# Patient Record
Sex: MALE | Race: WHITE | URBAN - METROPOLITAN AREA
[De-identification: names, ages, dates, MRNs, and addresses within clinical notes are randomized per-mention and may not be internally consistent; named-entity substitution may affect disease eponyms.]

---

## 2017-09-20 ENCOUNTER — OFFICE VISIT (OUTPATIENT)
Dept: ORTHOPEDIC SURGERY | Age: 37
End: 2017-09-20

## 2017-09-20 VITALS
BODY MASS INDEX: 29 KG/M2 | HEIGHT: 75 IN | DIASTOLIC BLOOD PRESSURE: 98 MMHG | RESPIRATION RATE: 16 BRPM | HEART RATE: 81 BPM | SYSTOLIC BLOOD PRESSURE: 134 MMHG | WEIGHT: 233.2 LBS

## 2017-09-20 DIAGNOSIS — M54.50 LOW BACK PAIN WITHOUT SCIATICA, UNSPECIFIED BACK PAIN LATERALITY, UNSPECIFIED CHRONICITY: Primary | ICD-10-CM

## 2017-09-20 DIAGNOSIS — M51.36 OTHER INTERVERTEBRAL DISC DEGENERATION, LUMBAR REGION: ICD-10-CM

## 2017-09-20 DIAGNOSIS — M47.816 SPONDYLOSIS OF LUMBAR REGION WITHOUT MYELOPATHY OR RADICULOPATHY: ICD-10-CM

## 2017-09-20 DIAGNOSIS — M54.16 LUMBAR NEURITIS: ICD-10-CM

## 2017-09-20 RX ORDER — METHYLPREDNISOLONE 4 MG/1
TABLET ORAL
Qty: 1 DOSE PACK | Refills: 0 | Status: SHIPPED | OUTPATIENT
Start: 2017-09-20 | End: 2017-10-30 | Stop reason: ALTCHOICE

## 2017-09-20 RX ORDER — ACETAMINOPHEN 500 MG
TABLET ORAL
COMMUNITY

## 2017-09-20 RX ORDER — TRAMADOL HYDROCHLORIDE 50 MG/1
50 TABLET ORAL 2 TIMES DAILY
Qty: 40 TAB | Refills: 0 | Status: SHIPPED | OUTPATIENT
Start: 2017-09-20 | End: 2017-10-20 | Stop reason: SDUPTHER

## 2017-09-20 RX ORDER — CYCLOBENZAPRINE HCL 5 MG
TABLET ORAL
COMMUNITY
Start: 2017-09-15 | End: 2017-10-30 | Stop reason: ALTCHOICE

## 2017-09-20 RX ORDER — DICLOFENAC SODIUM 75 MG/1
75 TABLET, DELAYED RELEASE ORAL 2 TIMES DAILY WITH MEALS
Qty: 30 TAB | Refills: 0 | Status: SHIPPED | OUTPATIENT
Start: 2017-09-20 | End: 2017-10-20 | Stop reason: SDUPTHER

## 2017-09-20 RX ORDER — DICLOFENAC SODIUM 75 MG/1
TABLET, DELAYED RELEASE ORAL
COMMUNITY
Start: 2017-09-12 | End: 2017-10-30 | Stop reason: SDUPTHER

## 2017-09-20 NOTE — MR AVS SNAPSHOT
Visit Information Date & Time Provider Department Dept. Phone Encounter #  
 9/20/2017 10:00 AM Ellen Story MD [facility] [place] [phone] [id number] Follow-up Instructions Return in about 4 weeks (around 10/18/2017). Upcoming Health Maintenance Date Due DTaP/Tdap/Td series (1 - Tdap) 4/8/2001 INFLUENZA AGE 9 TO ADULT 8/1/2017 Allergies as of 9/20/2017  Review Complete On: 9/20/2017 By: Ellen Story MD  
 No Known Allergies Current Immunizations  Never Reviewed No immunizations on file. Not reviewed this visit You Were Diagnosed With   
  
 Codes Comments Low back pain without sciatica, unspecified back pain laterality, unspecified chronicity    -  Primary ICD-10-CM: M54.5 ICD-9-CM: 724.2 Spondylosis of lumbar region without myelopathy or radiculopathy     ICD-10-CM: M47.816 ICD-9-CM: 721.3 Other intervertebral disc degeneration, lumbar region     ICD-10-CM: M51.36 
ICD-9-CM: 722.52 Lumbar neuritis     ICD-10-CM: M54.16 
ICD-9-CM: 724.4 Vitals BP Pulse Resp Height(growth percentile) Weight(growth percentile) BMI  
 (!) 134/98 81 16 6' 3\" (1.905 m) 233 lb 3.2 oz (105.8 kg) 29.15 kg/m2 Smoking Status Never Smoker Vitals History BMI and BSA Data Body Mass Index Body Surface Area  
 29.15 kg/m 2 2.37 m 2 Preferred Pharmacy Pharmacy Name Phone  
 [address] [place] [address] [phone] Your Updated Medication List  
  
   
This list is accurate as of: 9/20/17 11:18 AM.  Always use your most recent med list.  
  
  
  
  
 cyclobenzaprine 5 mg tablet Commonly known as:  FLEXERIL  
  
 * diclofenac EC 75 mg EC tablet Commonly known as:  VOLTAREN  
  
 * diclofenac EC 75 mg EC tablet Commonly known as:  VOLTAREN Take 1 Tab by mouth two (2) times daily (with meals). methylPREDNISolone 4 mg tablet Commonly known as:  Colan Necessary Per dose pack instructions TYLENOL EXTRA STRENGTH 500 mg tablet Generic drug:  acetaminophen Take  by mouth every six (6) hours as needed for Pain. * Notice: This list has 2 medication(s) that are the same as other medications prescribed for you. Read the directions carefully, and ask your doctor or other care provider to review them with you. Prescriptions Sent to Pharmacy Refills  
 methylPREDNISolone (MEDROL DOSEPACK) 4 mg tablet 0 Sig: Per dose pack instructions Class: Normal  
 Pharmacy: [address] [place] [address] Ph #: [phone]  
 diclofenac EC (VOLTAREN) 75 mg EC tablet 0 Sig: Take 1 Tab by mouth two (2) times daily (with meals). Class: Normal  
 Pharmacy: [address] [place] [address] Ph #: [phone] Route: Oral  
  
We Performed the Following AMB POC XRAY, SPINE, LUMBOSACRAL; 2 O [81079 CPT(R)] REFERRAL TO PHYSICAL THERAPY [DGP09 Custom] Comments:  
 DX:LBP to LLE 
HEP 
Jorge Luis Leisure 2-3 visits/ 2-3 weeks Follow-up Instructions Return in about 4 weeks (around 10/18/2017). Referral Information Referral ID Referred By Referred To  
  
 [id number] GREG ORSA III Not Available Visits Status Start Date End Date 1 New Request 9/20/17 9/20/18 If your referral has a status of pending review or denied, additional information will be sent to support the outcome of this decision. Introducing [facility]! [facility] introduces [employer] patient portal. Now you can access parts of your medical record, email your doctor's office, and request medication refills online. 1. In your internet browser, go to [email or web address] 2. Click on the First Time User? Click Here link in the Sign In box. You will see the New Member Sign Up page. 3. Enter your [employer] Access Code exactly as it appears below. You will not need to use this code after youve completed the sign-up process. If you do not sign up before the expiration date, you must request a new code. · [employer] Access Code: [email or web address] Expires: 12/19/2017  9:03 AM 
 
4. Enter the last four digits of your Social Security Number (xxxx) and Date of Birth (mm/dd/yyyy) as indicated and click Submit. You will be taken to the next sign-up page. 5. Create a [employer] ID. This will be your [employer] login ID and cannot be changed, so think of one that is secure and easy to remember. 6. Create a [employer] password. You can change your password at any time. 7. Enter your Password Reset Question and Answer. This can be used at a later time if you forget your password. 8. Enter your e-mail address. You will receive e-mail notification when new information is available in [address] 9. Click Sign Up. You can now view and download portions of your medical record. 10. Click the Download Summary menu link to download a portable copy of your medical information. If you have questions, please visit the Frequently Asked Questions section of the [employer] website. Remember, [employer] is NOT to be used for urgent needs. For medical emergencies, dial 911. Now available from your iPhone and Android! Please provide this summary of care documentation to your next provider. Your primary care clinician is listed as Ethel Robbins. If you have any questions after today's visit, please call [phone].

## 2017-09-20 NOTE — PROGRESS NOTES
[facility]  [address], [address]  Phone: [phone]  Fax: [phone]        INITIAL CONSULTATION      HISTORY OF PRESENT ILLNESS:  Alvino Burgos is a 40 y.o. male whom is referred from [facility] secondary to low back and left hip pain. He additionally has left ankle pain with  paraesthesias of the left foot, involving all digits. Symptoms present x 3 months. Pt previously had a similar episode of low back pain nine years ago which resolved with Ibuprofen. He also recalls having low back pain while riding in a car in 2013 (duration unclear). No recent injury/trauma. He rates pain 6-8/10. Pt reports limitations with standing tolerance. Pt denies h/o lumbar spinal surgery or blocks. He has not attended physical therapy/chiropractor. Note from [facility] dated 9/12/17 indicating patient was seen with c/o left hip and ankle pain with low back pain x 2-3 months. At that time, he was started on Diclofenac without benefit. He has taken Flexeril and Tylenol without relief. Pt denies h/o DM. Pt denies change in bowel or bladder habits. Pt denies fever, weight loss, or skin changes.  reviewed. History reviewed. No pertinent past medical history. History reviewed. No pertinent surgical history. Social History   Substance Use Topics    Smoking status: Never Smoker    Smokeless tobacco: Never Used    Alcohol use No     Work status: Not available. Marital status: The patient is . No Known Allergies       Family History   Problem Relation Age of Onset    Thyroid Disease Mother     Diabetes Brother          REVIEW OF SYSTEMS  Constitutional symptoms: Negative  Eyes: Negative  Ears, Nose, Throat, and Mouth: Negative  Cardiovascular: Negative  Respiratory: Negative  Genitourinary: Negative  Integumentary (Skin and/or breast): Negative  Musculoskeletal: Positive for low back, left hip and left ankle/foot pain.   Extremities: Negative for edema. Endocrine/Rheumatologic: Negative  Hematologic/Lymphatic: Negative  Allergic/Immunologic: Negative  Psychiatric: Negative       PHYSICAL EXAMINATION    Visit Vitals    BP (!) 134/98    Pulse 81    Resp 16    Ht 6' 3\" (1.905 m)    Wt 233 lb 3.2 oz (105.8 kg)    BMI 29.15 kg/m2       CONSTITUTIONAL: NAD, A&O x 3  HEART: Regular rate and rhythm  ABDOMEN: Positive bowel sounds, soft, nontender, and nondistended  LUNGS: Clear to auscultation bilaterally. SKIN: No rashes noted. RANGE OF MOTION: The patient has full passive range of motion in all four extremities. SENSATION: Sensation is intact to light touch throughout. MOTOR:   Straight Leg Raise: Positive, left; reversed straight leg raise, right. Randolph: Negative, bilateral  Deep tendon reflexes are 1+ at the brachioradialis, biceps, and triceps. Deep tendon reflexes are 2+ at the knees and right ankle, and 0 at the left ankles. Shoulder AB/Flex Elbow Flex Wrist Ext Elbow Ext Wrist Flex Hand Intrin Tone   Right +4/5 +4/5 +4/5 +4/5 +4/5 +4/5 +4/5   Left +4/5 +4/5 +4/5 +4/5 +4/5 +4/5 +4/5              Hip Flex Knee Ext Knee Flex Ankle DF GTE Ankle PF Tone   Right +4/5 +4/5 +4/5 +4/5 +4/5 +4/5 +4/5   Left +4/5 +4/5 +4/5 +4/5 +4/5 +4/5 +4/5     RADIOGRAPHS  Preliminary reading of lumbar plain films:  Listing to the right. Nonspecific straightening of the normal lumbar lordosis. Mild disc space narrowing at L2-3, L5-S1. No acute pathology identified. These are being sent out for official reading by Dr. Heriberto Wilson. ASSESSMENT   Diagnoses and all orders for this visit:    1.  Low back pain without sciatica, unspecified back pain laterality, unspecified chronicity  -     [51912] L/S 2-3 views  -     REFERRAL TO PHYSICAL THERAPY    2. Spondylosis of lumbar region without myelopathy or radiculopathy  -     REFERRAL TO PHYSICAL THERAPY    3. Other intervertebral disc degeneration, lumbar region  -     REFERRAL TO PHYSICAL THERAPY    4. Lumbar neuritis  -     REFERRAL TO PHYSICAL THERAPY    Other orders  -     methylPREDNISolone (MEDROL DOSEPACK) 4 mg tablet; Per dose pack instructions  -     diclofenac EC (VOLTAREN) 75 mg EC tablet; Take 1 Tab by mouth two (2) times daily (with meals). -     traMADol (ULTRAM) 50 mg tablet; Take 1 Tab by mouth two (2) times a day. Max Daily Amount: 100 mg. IMPRESSIONS/RECOMMENDATIONS:  The patient presents for low back, left hip and left ankle pain along with left foot paraesthesias x 3 months. He is neurologically intact. We discussed multiple treatment options. I will start him on Medrol Dosepak. I gave him a prescription for Diclofenac following completion of the Medrol Dosepak. I will give him a Rx for Tramadol prn. I will refer him to physical therapy with an emphasis on HEP. I will see the patient back in 1 month's time or earlier if needed. Written by Anjana Burgos, as dictated by Angie Kam MD  I examined the patient, reviewed and agree with the note.

## 2017-10-12 ENCOUNTER — TELEPHONE (OUTPATIENT)
Dept: ORTHOPEDIC SURGERY | Age: 37
End: 2017-10-12

## 2017-10-12 NOTE — TELEPHONE ENCOUNTER
Patient called in requesting to speak with Dr. Almyra Castleman. Patient states that it is in regards to the medication he has taken prior.  Please contact patient  At [phone] before 3pm and [phone] after 3pm

## 2017-10-20 ENCOUNTER — OFFICE VISIT (OUTPATIENT)
Dept: ORTHOPEDIC SURGERY | Age: 37
End: 2017-10-20

## 2017-10-20 VITALS
DIASTOLIC BLOOD PRESSURE: 85 MMHG | WEIGHT: 230 LBS | SYSTOLIC BLOOD PRESSURE: 127 MMHG | BODY MASS INDEX: 28.75 KG/M2 | HEART RATE: 74 BPM | RESPIRATION RATE: 18 BRPM

## 2017-10-20 DIAGNOSIS — M51.36 OTHER INTERVERTEBRAL DISC DEGENERATION, LUMBAR REGION: ICD-10-CM

## 2017-10-20 DIAGNOSIS — M47.816 SPONDYLOSIS OF LUMBAR REGION WITHOUT MYELOPATHY OR RADICULOPATHY: Primary | ICD-10-CM

## 2017-10-20 DIAGNOSIS — M54.16 LUMBAR NEURITIS: ICD-10-CM

## 2017-10-20 RX ORDER — TRAMADOL HYDROCHLORIDE 50 MG/1
50 TABLET ORAL 2 TIMES DAILY
Qty: 40 TAB | Refills: 0 | Status: SHIPPED | OUTPATIENT
Start: 2017-10-20

## 2017-10-20 RX ORDER — DICLOFENAC SODIUM 75 MG/1
75 TABLET, DELAYED RELEASE ORAL 2 TIMES DAILY WITH MEALS
Qty: 60 TAB | Refills: 0 | Status: SHIPPED | OUTPATIENT
Start: 2017-10-20 | End: 2017-11-16 | Stop reason: SDUPTHER

## 2017-10-20 RX ORDER — GABAPENTIN 300 MG/1
300 CAPSULE ORAL 3 TIMES DAILY
Qty: 90 CAP | Refills: 1 | Status: SHIPPED | OUTPATIENT
Start: 2017-10-20

## 2017-10-20 NOTE — PROGRESS NOTES
[facility]  [address], [place] [address]   Phone: [phone]  Fax: [phone]        PROGRESS NOTE      HISTORY OF PRESENT ILLNESS:  The patient is a 40 y.o. male and was seen today for follow up of low back and left hip pain. He additionally has left ankle pain with paraesthesias of the left foot, involving all digits. Symptoms present since 6/2017. Pt previously had a similar episode of low back pain nine years ago which resolved with Ibuprofen. He also recalls having low back pain while riding in a car in 2013 (duration unclear). No recent injury/trauma. Pt reports limitations with standing tolerance. Pt denies h/o lumbar spinal surgery or blocks. Note from Lizeth Connors PA-C dated 9/12/17 indicating patient was seen with c/o left hip and ankle pain with low back pain x 2-3 months. At that time, he was started on Diclofenac without benefit. He has taken Flexeril and Tylenol without relief. Pt denies h/o DM. Pt denies change in bowel or bladder habits. Pt denies fever, weight loss, or skin changes. Preliminary reading of lumbar plain films revealed listing to the right. Nonspecific straightening of the normal lumbar lordosis. Mild disc space narrowing at L2-3, L5-S1. No acute pathology identified. At his last clinical appointment, the patient presented for low back, left hip and left ankle pain along with left foot paraesthesias. He was neurologically intact. We discussed multiple treatment options. I started him on Medrol Dosepak. I gave him a prescription for Diclofenac following completion of the Medrol Dosepak. I gave him a Rx for Tramadol prn. I referred him to physical therapy with an emphasis on HEP. The patient returns today with low back and left hip pain. He states his left ankle pain has improved. Pt continues to experience left foot paraesthesias. He rates pain 4/10, an improvement since his last visit (6-8/10). Therapy notes reviewed.  Pt continues to be enrolled in physical therapy with slight relief. He performs his HEP daily. Pt reports significant relief with MDP and Diclofenac. He states Tramadol does relieve his pain.  reviewed. History reviewed. No pertinent past medical history. Social History     Social History    Marital status:      Spouse name: N/A    Number of children: N/A    Years of education: N/A     Occupational History    Not on file. Social History Main Topics    Smoking status: Never Smoker    Smokeless tobacco: Never Used    Alcohol use No    Drug use: No    Sexual activity: Not on file     Other Topics Concern    Not on file     Social History Narrative       Current Outpatient Prescriptions   Medication Sig Dispense Refill    diclofenac EC (VOLTAREN) 75 mg EC tablet Take 1 Tab by mouth two (2) times daily (with meals). 60 Tab 0    gabapentin (NEURONTIN) 300 mg capsule Take 1 Cap by mouth three (3) times daily. 90 Cap 1    traMADol (ULTRAM) 50 mg tablet Take 1 Tab by mouth two (2) times a day. Max Daily Amount: 100 mg. 40 Tab 0    acetaminophen (TYLENOL EXTRA STRENGTH) 500 mg tablet Take  by mouth every six (6) hours as needed for Pain.  cyclobenzaprine (FLEXERIL) 5 mg tablet       diclofenac EC (VOLTAREN) 75 mg EC tablet       methylPREDNISolone (MEDROL DOSEPACK) 4 mg tablet Per dose pack instructions 1 Dose Pack 0       No Known Allergies       PHYSICAL EXAMINATION    Visit Vitals    /85    Pulse 74    Resp 18    Wt 230 lb (104.3 kg)    BMI 28.75 kg/m2       CONSTITUTIONAL: NAD, A&O x 3  SENSATION: Intact to light touch throughout  RANGE OF MOTION: The patient has full passive range of motion in all four extremities. MOTOR:  Straight Leg Raise: Positive, right               Hip Flex Knee Ext Knee Flex Ankle DF GTE Ankle PF Tone   Right +4/5 +4/5 +4/5 +4/5 +4/5 +4/5 +4/5   Left +4/5 +4/5 +4/5 +4/5 +4/5 +4/5 +4/5       ASSESSMENT   Diagnoses and all orders for this visit:    1. Spondylosis of lumbar region without myelopathy or radiculopathy  -     MRI LUMB SPINE WO CONT; Future    2. Other intervertebral disc degeneration, lumbar region  -     MRI LUMB SPINE WO CONT; Future    3. Lumbar neuritis  -     MRI LUMB SPINE WO CONT; Future    Other orders  -     diclofenac EC (VOLTAREN) 75 mg EC tablet; Take 1 Tab by mouth two (2) times daily (with meals). -     gabapentin (NEURONTIN) 300 mg capsule; Take 1 Cap by mouth three (3) times daily. -     traMADol (ULTRAM) 50 mg tablet; Take 1 Tab by mouth two (2) times a day. Max Daily Amount: 100 mg. IMPRESSION AND PLAN:  Patient reports temporary relief with physical therapy and oral steroids. He states symptoms have regressed. I will set him up for a lumbar spine MRI. I advised patient to bring copies of films to next visit. I will try him on Neurontin 300 mg TID. The risks, benefits, and potential side effects of this medication were discussed. Patient understands and wishes to proceed. Patient advised to call the office if intolerant to new medication. He was provided with refills of Diclofenac and Tramadol. I will see the patient back following MRI. Written by Pastora Rodríguez, as dictated by Alessia Rosen MD  I examined the patient, reviewed and agree with the note.

## 2017-10-20 NOTE — MR AVS SNAPSHOT
Visit Information Date & Time Provider Department Dept. Phone Encounter #  
 10/20/2017  8:40 AM Wilbert Lopez MD [facility] [address] [facility] - [facility] [phone] [id number] Follow-up Instructions Return for following MRI. Upcoming Health Maintenance Date Due DTaP/Tdap/Td series (1 - Tdap) 4/8/2001 INFLUENZA AGE 9 TO ADULT 8/1/2017 Allergies as of 10/20/2017  Review Complete On: 10/20/2017 By: Wilbert Lopez MD  
 No Known Allergies Current Immunizations  Never Reviewed No immunizations on file. Not reviewed this visit You Were Diagnosed With   
  
 Codes Comments Spondylosis of lumbar region without myelopathy or radiculopathy    -  Primary ICD-10-CM: M47.816 ICD-9-CM: 721.3 Other intervertebral disc degeneration, lumbar region     ICD-10-CM: M51.36 
ICD-9-CM: 722.52 Lumbar neuritis     ICD-10-CM: M54.16 
ICD-9-CM: 724.4 Vitals BP Pulse Resp Weight(growth percentile) BMI Smoking Status 127/85 74 18 230 lb (104.3 kg) 28.75 kg/m2 Never Smoker BMI and BSA Data Body Mass Index Body Surface Area 28.75 kg/m 2 2.35 m 2 Preferred Pharmacy Pharmacy Name Phone  
 [address] [place] [address] [phone] Your Updated Medication List  
  
   
This list is accurate as of: 10/20/17  9:15 AM.  Always use your most recent med list.  
  
  
  
  
 cyclobenzaprine 5 mg tablet Commonly known as:  FLEXERIL  
  
 * diclofenac EC 75 mg EC tablet Commonly known as:  VOLTAREN  
  
 * diclofenac EC 75 mg EC tablet Commonly known as:  VOLTAREN Take 1 Tab by mouth two (2) times daily (with meals). gabapentin 300 mg capsule Commonly known as:  NEURONTIN Take 1 Cap by mouth three (3) times daily. methylPREDNISolone 4 mg tablet Commonly known as:  Hale Terrance Per dose pack instructions  
  
 traMADol 50 mg tablet Commonly known as:  ULTRAM  
Take 1 Tab by mouth two (2) times a day. Max Daily Amount: 100 mg.  
  
 TYLENOL EXTRA STRENGTH 500 mg tablet Generic drug:  acetaminophen Take  by mouth every six (6) hours as needed for Pain. * Notice: This list has 2 medication(s) that are the same as other medications prescribed for you. Read the directions carefully, and ask your doctor or other care provider to review them with you. Prescriptions Sent to Pharmacy Refills  
 diclofenac EC (VOLTAREN) 75 mg EC tablet 0 Sig: Take 1 Tab by mouth two (2) times daily (with meals). Class: Normal  
 Pharmacy: [address] [place] [address] Ph #: [phone] Route: Oral  
 gabapentin (NEURONTIN) 300 mg capsule 1 Sig: Take 1 Cap by mouth three (3) times daily. Class: Normal  
 Pharmacy: [address] [place] [address] Ph #: [phone] Route: Oral  
  
Follow-up Instructions Return for following MRI. To-Do List   
 10/25/2017 Imaging:  MRI LUMB SPINE WO CONT Introducing [facility]! [facility] introduces [employer] patient portal. Now you can access parts of your medical record, email your doctor's office, and request medication refills online. 1. In your internet browser, go to [email or web address] 2. Click on the First Time User? Click Here link in the Sign In box. You will see the New Member Sign Up page. 3. Enter your [employer] Access Code exactly as it appears below. You will not need to use this code after youve completed the sign-up process. If you do not sign up before the expiration date, you must request a new code. · [employer] Access Code: [email or web address] Expires: 12/19/2017  9:03 AM 
 
4. Enter the last four digits of your Social Security Number (xxxx) and Date of Birth (mm/dd/yyyy) as indicated and click Submit. You will be taken to the next sign-up page. 5. Create a [employer]Who ID. This will be your [employer] login ID and cannot be changed, so think of one that is secure and easy to remember. 6. Create a [employer] password. You can change your password at any time. 7. Enter your Password Reset Question and Answer. This can be used at a later time if you forget your password. 8. Enter your e-mail address. You will receive e-mail notification when new information is available in [address] 9. Click Sign Up. You can now view and download portions of your medical record. 10. Click the Download Summary menu link to download a portable copy of your medical information. If you have questions, please visit the Frequently Asked Questions section of the [employer] website. Remember, [employer] is NOT to be used for urgent needs. For medical emergencies, dial 911. Now available from your iPhone and Android! Please provide this summary of care documentation to your next provider. Your primary care clinician is listed as Meri Puga. If you have any questions after today's visit, please call [phone].

## 2017-10-30 ENCOUNTER — OFFICE VISIT (OUTPATIENT)
Dept: ORTHOPEDIC SURGERY | Age: 37
End: 2017-10-30

## 2017-10-30 VITALS
DIASTOLIC BLOOD PRESSURE: 92 MMHG | BODY MASS INDEX: 29.09 KG/M2 | WEIGHT: 234 LBS | HEIGHT: 75 IN | SYSTOLIC BLOOD PRESSURE: 144 MMHG | HEART RATE: 68 BPM

## 2017-10-30 DIAGNOSIS — M47.816 SPONDYLOSIS OF LUMBAR REGION WITHOUT MYELOPATHY OR RADICULOPATHY: Primary | ICD-10-CM

## 2017-10-30 DIAGNOSIS — M51.36 OTHER INTERVERTEBRAL DISC DEGENERATION, LUMBAR REGION: ICD-10-CM

## 2017-10-30 DIAGNOSIS — M54.16 LUMBAR RADICULOPATHY: ICD-10-CM

## 2017-10-30 NOTE — MR AVS SNAPSHOT
Visit Information Date & Time Provider Department Dept. Phone Encounter #  
 10/30/2017  3:40 PM Vianey Paul MD [address] [place] [address], [address] [facility] [phone] [id number] Follow-up Instructions Return in about 4 weeks (around 11/27/2017). Upcoming Health Maintenance Date Due DTaP/Tdap/Td series (1 - Tdap) 4/8/2001 INFLUENZA AGE 9 TO ADULT 8/1/2017 Allergies as of 10/30/2017  Review Complete On: 10/30/2017 By: Vianey Paul MD  
 No Known Allergies Current Immunizations  Never Reviewed No immunizations on file. Not reviewed this visit You Were Diagnosed With   
  
 Codes Comments Spondylosis of lumbar region without myelopathy or radiculopathy    -  Primary ICD-10-CM: M47.816 ICD-9-CM: 721.3 Other intervertebral disc degeneration, lumbar region     ICD-10-CM: M51.36 
ICD-9-CM: 722.52 Lumbar radiculopathy     ICD-10-CM: M54.16 
ICD-9-CM: 724.4 Vitals BP Pulse Height(growth percentile) Weight(growth percentile) BMI Smoking Status (!) 144/92 68 6' 3\" (1.905 m) 234 lb (106.1 kg) 29.25 kg/m2 Never Smoker Vitals History BMI and BSA Data Body Mass Index Body Surface Area  
 29.25 kg/m 2 2.37 m 2 Preferred Pharmacy Pharmacy Name Phone  
 [address] [place] [address] [phone] Your Updated Medication List  
  
   
This list is accurate as of: 10/30/17  4:53 PM.  Always use your most recent med list.  
  
  
  
  
 diclofenac EC 75 mg EC tablet Commonly known as:  VOLTAREN Take 1 Tab by mouth two (2) times daily (with meals). gabapentin 300 mg capsule Commonly known as:  NEURONTIN Take 1 Cap by mouth three (3) times daily. traMADol 50 mg tablet Commonly known as:  ULTRAM  
Take 1 Tab by mouth two (2) times a day. Max Daily Amount: 100 mg.  
  
 TYLENOL EXTRA STRENGTH 500 mg tablet Generic drug:  acetaminophen Take  by mouth every six (6) hours as needed for Pain. Follow-up Instructions Return in about 4 weeks (around 11/27/2017). Introducing [facility] & HEALTH SERVICES! [employer] introduces [employer] patient portal. Now you can access parts of your medical record, email your doctor's office, and request medication refills online. 1. In your internet browser, go to [email or web address] 2. Click on the First Time User? Click Here link in the Sign In box. You will see the New Member Sign Up page. 3. Enter your [employer] Access Code exactly as it appears below. You will not need to use this code after youve completed the sign-up process. If you do not sign up before the expiration date, you must request a new code. · [employer] Access Code: [email or web address] Expires: 12/19/2017  9:03 AM 
 
4. Enter the last four digits of your Social Security Number (xxxx) and Date of Birth (mm/dd/yyyy) as indicated and click Submit. You will be taken to the next sign-up page. 5. Create a [employer] ID. This will be your [employer] login ID and cannot be changed, so think of one that is secure and easy to remember. 6. Create a [employer] password. You can change your password at any time. 7. Enter your Password Reset Question and Answer. This can be used at a later time if you forget your password. 8. Enter your e-mail address. You will receive e-mail notification when new information is available in [address]. 9. Click Sign Up. You can now view and download portions of your medical record. 10. Click the Download Summary menu link to download a portable copy of your medical information. If you have questions, please visit the Frequently Asked Questions section of the [employer] website. Remember, [employer] is NOT to be used for urgent needs. For medical emergencies, dial 911. Now available from your iPhone and Android! Please provide this summary of care documentation to your next provider. Your primary care clinician is listed as Rohini Watkins. If you have any questions after today's visit, please call [phone].

## 2017-10-30 NOTE — PROGRESS NOTES
[facility]  [address], [place]   Phone: [phone]  Fax: [phone]        PROGRESS NOTE      HISTORY OF PRESENT ILLNESS:  The patient is a 40 y.o. male and was seen today for follow up of low back and left hip pain. He states his left ankle pain has improved. Pt continues to experience left foot paraesthesias (tingling, denies numbness). Symptoms present since 6/2017. Pt previously had a similar episode of low back pain nine years ago which resolved with Ibuprofen. He also recalls having low back pain while riding in a car in 2013 (duration unclear). No recent injury/trauma. Pt reports limitations with standing tolerance. Pt denies h/o lumbar spinal surgery or blocks. Note from Juventino Gamboa PA-C dated 9/12/17 indicating patient was seen with c/o left hip and ankle pain with low back pain x 2-3 months. At that time, he was started on Diclofenac without benefit. He has taken Flexeril and Tylenol without relief. Pt reports significant relief with MDP and Diclofenac. He states Tramadol does relieve his pain. Pt denies h/o DM or glaucoma. Pt denies change in bowel or bladder habits. Pt denies fever, weight loss, or skin changes. Preliminary reading of lumbar plain films revealed listing to the right. Nonspecific straightening of the normal lumbar lordosis. Mild disc space narrowing at L2-3, L5-S1. No acute pathology identified. At his last clinical appointment, patient reported temporary relief with physical therapy and oral steroids. He stated symptoms have regressed. I set him up for a lumbar spine MRI. I tried him on Neurontin 300 mg TID. He was provided with refills of Diclofenac and Tramadol. The patient returns today with pain location and distribution remain unchanged. He rates pain 1-3/10, an improvement since his last visit (4/10). Pt is tolerating Neurontin 300 mg TID noting slight improvement in symptoms.  However, he states it is inconvenient taking the medication three times daily. He continues to use Diclofenac 75 mg BID. Pt performs his HEP daily. Lumbar spine MRI dated 10/25/17 reviewed. Per report, at L5-S1, posterior radial annular fissure. 7.5 x 12.5 mm left central disc protrusion with marked mass effect on the left S1 nerve root sleeve.  reviewed. History reviewed. No pertinent past medical history. Social History     Social History    Marital status:      Spouse name: N/A    Number of children: N/A    Years of education: N/A     Occupational History    Not on file. Social History Main Topics    Smoking status: Never Smoker    Smokeless tobacco: Never Used    Alcohol use No    Drug use: No    Sexual activity: Not on file     Other Topics Concern    Not on file     Social History Narrative       Current Outpatient Prescriptions   Medication Sig Dispense Refill    diclofenac EC (VOLTAREN) 75 mg EC tablet Take 1 Tab by mouth two (2) times daily (with meals). 60 Tab 0    gabapentin (NEURONTIN) 300 mg capsule Take 1 Cap by mouth three (3) times daily. 90 Cap 1    traMADol (ULTRAM) 50 mg tablet Take 1 Tab by mouth two (2) times a day. Max Daily Amount: 100 mg. 40 Tab 0    acetaminophen (TYLENOL EXTRA STRENGTH) 500 mg tablet Take  by mouth every six (6) hours as needed for Pain. No Known Allergies       PHYSICAL EXAMINATION    Visit Vitals    BP (!) 144/92    Pulse 68    Ht 6' 3\" (1.905 m)    Wt 234 lb (106.1 kg)    BMI 29.25 kg/m2       CONSTITUTIONAL: NAD, A&O x 3  SENSATION: Intact to light touch throughout  RANGE OF MOTION: The patient has full passive range of motion in all four extremities. MOTOR:  Straight Leg Raise: Positive, left               Hip Flex Knee Ext Knee Flex Ankle DF GTE Ankle PF Tone   Right +4/5 +4/5 +4/5 +4/5 +4/5 +4/5 +4/5   Left +4/5 +4/5 +4/5 +4/5 +4/5 +4/5 +4/5       ASSESSMENT   Diagnoses and all orders for this visit:    1.  Spondylosis of lumbar region without myelopathy or radiculopathy    2. Other intervertebral disc degeneration, lumbar region    3. Lumbar radiculopathy          IMPRESSION AND PLAN:  Patient is neurologically intact. His symptoms appear to be originating from the left central disc protrusion at the L5-S1 level. Patient is not particularly interested in surgical intervention or lumbar blocks at this time. I did offer to try him on Cymbalta or Topamax to better fit his lifestyle. Patient declines. He should continue with Neurontin 300 mg TID as it is too early to increase the dose at this point. I recommend he use Diclofenac prn and not daily. I explained to the patient the risk of chronic excessive use of NSAIDs due to increased possibility of renal failure, GI bleeding, stomach ulcers and stroke/myocardial infarction. I encourage patient to perform his HEP daily. I will see the patient back in 1 month's time or earlier if needed. Written by Lanette Lange, as dictated by Cassell Moritz, MD  I examined the patient, reviewed and agree with the note.

## 2017-11-15 ENCOUNTER — TELEPHONE (OUTPATIENT)
Dept: ORTHOPEDIC SURGERY | Age: 37
End: 2017-11-15

## 2017-11-15 NOTE — TELEPHONE ENCOUNTER
Is he seeing any relief on this dose? If so, we can increase his evening dose to 600 mg. If not, Dr. Fatoumata Lemus offered to change to Cymbalta or Topamax-patient declined;.

## 2017-11-15 NOTE — TELEPHONE ENCOUNTER
Patient called to discuss current meds, last seen 10/30, pt has upcoming appt 11/21. He states current meds/dosage is not effective - can we offer any suggestions before upcoming appt? Patient also reports his cell phone will not ring so he cannot tell when calls come in, if possible, can we text him with a call back name and phone # & he can call us back - phone # [phone].

## 2017-11-16 RX ORDER — TOPIRAMATE 25 MG/1
75 TABLET ORAL
Qty: 90 TAB | Refills: 0 | Status: SHIPPED | OUTPATIENT
Start: 2017-11-16 | End: 2017-12-22 | Stop reason: ALTCHOICE

## 2017-11-16 RX ORDER — DICLOFENAC SODIUM 75 MG/1
75 TABLET, DELAYED RELEASE ORAL 2 TIMES DAILY WITH MEALS
Qty: 60 TAB | Refills: 0 | Status: SHIPPED | OUTPATIENT
Start: 2017-11-16 | End: 2017-12-22 | Stop reason: SDUPTHER

## 2017-11-16 NOTE — TELEPHONE ENCOUNTER
Called patient, verified , provided instructions on how to come off of Gabapentin slowly, attempted to mail instructions on how to slowly increase Topamax, however patient was ok with receiving instructions for slowly ramping Topamax at the same time. Provided instructions for Topamax, patient verbalized agreement/understanding. Patient would like to cancel appointment next week and reschedule about 4-6 weeks out from now. Will have  contact patient to reschedule. No further action required at this time.

## 2017-11-16 NOTE — TELEPHONE ENCOUNTER
Approved. Please instruct patient on how to taper off Gabapentin and then taper up Topamax. He should not start Topamax until he is off Gabapentin. Ok to move apt to December as we are making this change now.

## 2017-11-16 NOTE — TELEPHONE ENCOUNTER
Called patient, verified , informed of below, patient would like to try Topamax, and is wanting a refill of diclofenac. Patient also concerned about appointment that has been bumped up to next week, patient is concerned it may be a wasted visit, as we will not see effects of Topamax by next Tuesday. Patient also wants to reschedule next appointment for sometime in December. Please review pended meds and advise.

## 2017-11-20 DIAGNOSIS — M47.816 SPONDYLOSIS OF LUMBAR REGION WITHOUT MYELOPATHY OR RADICULOPATHY: ICD-10-CM

## 2017-11-20 DIAGNOSIS — M54.16 LUMBAR NEURITIS: ICD-10-CM

## 2017-11-20 DIAGNOSIS — M51.36 OTHER INTERVERTEBRAL DISC DEGENERATION, LUMBAR REGION: ICD-10-CM

## 2017-12-08 ENCOUNTER — TELEPHONE (OUTPATIENT)
Dept: ORTHOPEDIC SURGERY | Age: 37
End: 2017-12-08

## 2017-12-08 NOTE — TELEPHONE ENCOUNTER
Dr. Tania Montgomery will discuss the possibility of SI at the next office visit.  He will re-evaluate the patient and determine which block would be most beneficial.

## 2017-12-08 NOTE — TELEPHONE ENCOUNTER
PATIENT CALLED FOR DR. ROSA. PATIENT IS ASKING FOR  A CALL BACK IN REGARDS TO DISCUSSING GETTING AN INJECTION FOR HIS BACK. PATIENT HAS AN APPOINTMENT ALREADY SCHEDULED TO SEE DR. ROSA ON 12/22/17. PATIENT SAID HE DOES NOT KNOW WHICH INJECTION HE WILL NEED FOR HIS BACK. PATIENT TEL. [phone].

## 2017-12-22 ENCOUNTER — OFFICE VISIT (OUTPATIENT)
Dept: ORTHOPEDIC SURGERY | Age: 37
End: 2017-12-22

## 2017-12-22 VITALS
HEIGHT: 75 IN | WEIGHT: 227 LBS | DIASTOLIC BLOOD PRESSURE: 91 MMHG | BODY MASS INDEX: 28.23 KG/M2 | SYSTOLIC BLOOD PRESSURE: 137 MMHG | HEART RATE: 72 BPM

## 2017-12-22 DIAGNOSIS — M54.16 LUMBAR RADICULOPATHY: ICD-10-CM

## 2017-12-22 DIAGNOSIS — M47.816 SPONDYLOSIS OF LUMBAR REGION WITHOUT MYELOPATHY OR RADICULOPATHY: Primary | ICD-10-CM

## 2017-12-22 DIAGNOSIS — M51.36 OTHER INTERVERTEBRAL DISC DEGENERATION, LUMBAR REGION: ICD-10-CM

## 2017-12-22 RX ORDER — IBUPROFEN 200 MG
TABLET ORAL
COMMUNITY

## 2017-12-22 RX ORDER — DULOXETIN HYDROCHLORIDE 30 MG/1
30 CAPSULE, DELAYED RELEASE ORAL DAILY
Qty: 30 CAP | Refills: 1 | Status: SHIPPED | OUTPATIENT
Start: 2017-12-22

## 2017-12-22 RX ORDER — DICLOFENAC SODIUM 75 MG/1
75 TABLET, DELAYED RELEASE ORAL 2 TIMES DAILY WITH MEALS
Qty: 60 TAB | Refills: 0 | Status: SHIPPED | OUTPATIENT
Start: 2017-12-22

## 2017-12-22 NOTE — MR AVS SNAPSHOT
Visit Information Date & Time Provider Department Dept. Phone Encounter #  
 12/22/2017  2:10 PM Ammy Rodríguez MD [facility] [place] [address] [phone] [id number] Upcoming Health Maintenance Date Due DTaP/Tdap/Td series (1 - Tdap) 4/8/2001 Influenza Age 5 to Adult 8/1/2017 Allergies as of 12/22/2017  Review Complete On: 12/22/2017 By: Ammy Rodríguez MD  
 No Known Allergies Current Immunizations  Never Reviewed No immunizations on file. Not reviewed this visit You Were Diagnosed With   
  
 Codes Comments Spondylosis of lumbar region without myelopathy or radiculopathy    -  Primary ICD-10-CM: M47.816 ICD-9-CM: 721.3 Vitals BP Pulse Height(growth percentile) Weight(growth percentile) BMI Smoking Status (!) 137/91 72 6' 3\" (1.905 m) 227 lb (103 kg) 28.37 kg/m2 Never Smoker Vitals History BMI and BSA Data Body Mass Index Body Surface Area  
 28.37 kg/m 2 2.33 m 2 Preferred Pharmacy Pharmacy Name Phone  
 [address] [place] [address] [phone] Your Updated Medication List  
  
   
This list is accurate as of: 12/22/17  2:53 PM.  Always use your most recent med list.  
  
  
  
  
 diclofenac EC 75 mg EC tablet Commonly known as:  VOLTAREN Take 1 Tab by mouth two (2) times daily (with meals). DULoxetine 30 mg capsule Commonly known as:  CYMBALTA Take 1 Cap by mouth daily. gabapentin 300 mg capsule Commonly known as:  NEURONTIN Take 1 Cap by mouth three (3) times daily. ibuprofen 200 mg tablet Commonly known as:  MOTRIN Take  by mouth. traMADol 50 mg tablet Commonly known as:  ULTRAM  
Take 1 Tab by mouth two (2) times a day. Max Daily Amount: 100 mg.  
  
 TYLENOL EXTRA STRENGTH 500 mg tablet Generic drug:  acetaminophen Take  by mouth every six (6) hours as needed for Pain. Prescriptions Sent to Pharmacy Refills DULoxetine (CYMBALTA) 30 mg capsule 1 Sig: Take 1 Cap by mouth daily. Class: Normal  
 Pharmacy: [address] [place] [address], [address] [place] [address] #: [phone] Route: Oral  
  
Introducing [facility] & HEALTH SERVICES! [employer] introduces [employer] patient portal. Now you can access parts of your medical record, email your doctor's office, and request medication refills online. 1. In your internet browser, go to [email or web address] 2. Click on the First Time User? Click Here link in the Sign In box. You will see the New Member Sign Up page. 3. Enter your [employer] Access Code exactly as it appears below. You will not need to use this code after youve completed the sign-up process. If you do not sign up before the expiration date, you must request a new code. · [employer] Access Code: [email or web address] Expires: 3/22/2018  1:53 PM 
 
4. Enter the last four digits of your Social Security Number (xxxx) and Date of Birth (mm/dd/yyyy) as indicated and click Submit. You will be taken to the next sign-up page. 5. Create a [employer] ID. This will be your [employer] login ID and cannot be changed, so think of one that is secure and easy to remember. 6. Create a [employer] password. You can change your password at any time. 7. Enter your Password Reset Question and Answer. This can be used at a later time if you forget your password. 8. Enter your e-mail address. You will receive e-mail notification when new information is available in [address] 9. Click Sign Up. You can now view and download portions of your medical record. 10. Click the Download Summary menu link to download a portable copy of your medical information. If you have questions, please visit the Frequently Asked Questions section of the [employer] website. Remember, [employer] is NOT to be used for urgent needs. For medical emergencies, dial 911. Now available from your iPhone and Android! Please provide this summary of care documentation to your next provider. Your primary care clinician is listed as Natasha Munoz. If you have any questions after today's visit, please call [phone].

## 2017-12-22 NOTE — PROGRESS NOTES
[facility]  [address], [place]   Phone: [phone]  Fax: [phone]        PROGRESS NOTE      HISTORY OF PRESENT ILLNESS:  The patient is a 40 y.o. male and was seen today for follow up of low back and left hip pain. He states his left ankle pain has improved. Pt continues to experience left foot paraesthesias (tingling, denies numbness). Symptoms present since 6/2017. Pt previously had a similar episode of low back pain nine years ago which resolved with Ibuprofen. He also recalls having low back pain while riding in a car in 2013 (duration unclear). No recent injury/trauma. Pt reports limitations with standing tolerance. Pt denies h/o lumbar spinal surgery or blocks. Note from Nataly Salas PA-C dated 9/12/17 indicating patient was seen with c/o left hip and ankle pain with low back pain x 2-3 months. At that time, he was started on Diclofenac without benefit. He has taken Flexeril and Tylenol without relief. Pt reports significant relief with MDP and Diclofenac. He states Tramadol does relieve his pain. Pt denies h/o DM or glaucoma. Pt denies change in bowel or bladder habits. Pt denies fever, weight loss, or skin changes. Preliminary reading of lumbar plain films revealed listing to the right. Nonspecific straightening of the normal lumbar lordosis. Mild disc space narrowing at L2-3, L5-S1. No acute pathology identified. Lumbar spine MRI dated 10/25/17 reviewed. Per report, at L5-S1, posterior radial annular fissure. 7.5 x 12.5 mm left central disc protrusion with marked mass effect on the left S1 nerve root sleeve. At his last clinical appointment, patient is neurologically intact. His symptoms appeared to be originating from the left central disc protrusion at the L5-S1 level. Patient was not particularly interested in surgical intervention or lumbar blocks at this time. I offered to try him on Cymbalta or Topamax to better fit his lifestyle.  Patient declined. He should continue with Neurontin 300 mg TID. I recommended he use Diclofenac prn and not daily. I explained to the patient the risk of chronic excessive use of NSAIDs due to increased possibility of renal failure, GI bleeding, stomach ulcers and stroke/myocardial infarction. I encouraged patient to perform his HEP daily. The patient returns today c/o low back and coccyx pain radiating to his left foot. Pt states that his current symptoms are affecting his quality of life. He rates pain 6/10, an increase since his last visit (1-3/20). Pt reports that his pain is aggravated by standing and walking. Pt failed NEURONTIN 600 mg TID and TOPAMAX 75 mg qhs. He continues to use Diclofenac 75 mg BID and Tylenol with benefit. Pt completed physical therapy and is not consistent with his HEP.  reviewed. Body mass index is 28.37 kg/(m^2). History reviewed. No pertinent past medical history. Social History     Social History    Marital status:      Spouse name: N/A    Number of children: N/A    Years of education: N/A     Occupational History    Not on file. Social History Main Topics    Smoking status: Never Smoker    Smokeless tobacco: Never Used    Alcohol use No    Drug use: No    Sexual activity: Not on file     Other Topics Concern    Not on file     Social History Narrative       Current Outpatient Prescriptions   Medication Sig Dispense Refill    ibuprofen (MOTRIN) 200 mg tablet Take  by mouth.  DULoxetine (CYMBALTA) 30 mg capsule Take 1 Cap by mouth daily. 30 Cap 1    diclofenac EC (VOLTAREN) 75 mg EC tablet Take 1 Tab by mouth two (2) times daily (with meals). 60 Tab 0    traMADol (ULTRAM) 50 mg tablet Take 1 Tab by mouth two (2) times a day. Max Daily Amount: 100 mg. 40 Tab 0    acetaminophen (TYLENOL EXTRA STRENGTH) 500 mg tablet Take  by mouth every six (6) hours as needed for Pain.       gabapentin (NEURONTIN) 300 mg capsule Take 1 Cap by mouth three (3) times daily. (Patient not taking: Reported on 12/22/2017) 90 Cap 1       No Known Allergies       PHYSICAL EXAMINATION    Visit Vitals    BP (!) 137/91    Pulse 72    Ht 6' 3\" (1.905 m)    Wt 227 lb (103 kg)    BMI 28.37 kg/m2       CONSTITUTIONAL: NAD, A&O x 3  SENSATION: Intact to light touch throughout  NEURO: Darren's is negative bilaterally. RANGE OF MOTION: The patient has full passive range of motion in all four extremities. MOTOR:  Straight Leg Raise: Negative, bilateral               Hip Flex Knee Ext Knee Flex Ankle DF GTE Ankle PF Tone   Right +4/5 +4/5 +4/5 +4/5 +4/5 +4/5 +4/5   Left +4/5 +4/5 +4/5 +4/5 +4/5 +4/5 +4/5       ASSESSMENT   Diagnoses and all orders for this visit:    1. Spondylosis of lumbar region without myelopathy or radiculopathy    2. Other intervertebral disc degeneration, lumbar region    3. Lumbar radiculopathy    Other orders  -     DULoxetine (CYMBALTA) 30 mg capsule; Take 1 Cap by mouth daily.  -     SCHEDULE SURGERY        IMPRESSION AND PLAN:  Patient continuous to have low back pain and coccyx pain radiating to his LLE. I discussed multiple treatment options. Patient  would like to proceed with lumbar block. I will order L4/5 epidural.  I will try him on Cymbalta 30 mg per day. The risks, benefits, and potential side effects of this medication were discussed. Patient understands and wishes to proceed. Patient advised to call the office if intolerant to new medication. I recommend he increase the frequency of HEP to daily. I will see the patient back following block. Written by Alber Burns, as dictated by Li Luis MD  I examined the patient, reviewed and agree with the note.

## 2018-02-07 ENCOUNTER — TELEPHONE (OUTPATIENT)
Dept: ORTHOPEDIC SURGERY | Age: 38
End: 2018-02-07

## 2018-02-07 NOTE — TELEPHONE ENCOUNTER
Patient returned call ref Gu[place] . Bret Bermudez called patient.  Pleasea call patient back at [phone] or [phone]

## 2018-02-07 NOTE — TELEPHONE ENCOUNTER
PATIENT CALLED FOR DR. ROSA. PATIENT WOULD LIKE TO KNOW HOW HE CAN GET OFF THE CYMBALTA MEDICATION. PATIENT SAID HE IS NOW SEEING ANOTHER DOCTOR. THAT HE IS NO LONGER SEEING DR. ROSA, BUT THAT DR. RSOA HAD PRESCRIBED HIM THE CYMBALTA MEDICATION. PATIENT TEL. [phone] ( PATIENT SAID THAT IF HE CAN NOT BE REACHED TO LEAVE A MESSAGE).